# Patient Record
Sex: FEMALE | Race: WHITE | Employment: UNEMPLOYED | URBAN - METROPOLITAN AREA
[De-identification: names, ages, dates, MRNs, and addresses within clinical notes are randomized per-mention and may not be internally consistent; named-entity substitution may affect disease eponyms.]

---

## 2024-07-03 SDOH — HEALTH STABILITY: PHYSICAL HEALTH: ON AVERAGE, HOW MANY DAYS PER WEEK DO YOU ENGAGE IN MODERATE TO STRENUOUS EXERCISE (LIKE A BRISK WALK)?: 3 DAYS

## 2024-07-03 SDOH — HEALTH STABILITY: PHYSICAL HEALTH: ON AVERAGE, HOW MANY MINUTES DO YOU ENGAGE IN EXERCISE AT THIS LEVEL?: 30 MIN

## 2024-07-05 ENCOUNTER — OFFICE VISIT (OUTPATIENT)
Dept: ORTHOPEDIC SURGERY | Age: 34
End: 2024-07-05

## 2024-07-05 VITALS — WEIGHT: 240 LBS | BODY MASS INDEX: 37.67 KG/M2 | HEIGHT: 67 IN

## 2024-07-05 DIAGNOSIS — S93.492A SPRAIN OF ANTERIOR TALOFIBULAR LIGAMENT OF LEFT ANKLE, INITIAL ENCOUNTER: Primary | ICD-10-CM

## 2024-07-05 DIAGNOSIS — M25.572 LEFT ANKLE PAIN, UNSPECIFIED CHRONICITY: ICD-10-CM

## 2024-07-05 RX ORDER — MELOXICAM 15 MG/1
15 TABLET ORAL DAILY PRN
Qty: 30 TABLET | Refills: 0 | Status: SHIPPED | OUTPATIENT
Start: 2024-07-05

## 2024-07-05 NOTE — PROGRESS NOTES
ORTHOPAEDIC SURGERY H&P / CONSULTATION NOTE    Chief complaint:   Chief Complaint   Patient presents with    Ankle Pain     NP L ANKLE        History of present illness: The patient is a 33 y.o. female with subjective symptoms of left ankle pain. The chief complaint is located at anterior lateral aspect left ankle. Duration of symptoms has been for 3 days. The severity of symptoms is rated at 6/10 pain on intake form.  Patient injured her ankle on 7/2/2024 where she was stepping having unloaded the car with children and twisted her ankle because it was possible.  Ultimately she states dull throbbing aching pain as well as a sharp pain.  She went to an urgent care facility near the location of the injury.  She was placed in a boot.  She states previous ankle sprains.  She been taking ibuprofen and Tylenol as needed.  She states swelling.    The patient has tried the below listed items prior to today's consultation for above listed chief complaint.     +   Over-the-counter anti-inflammatories/prescription medication anti-inflammatory.     -   Physical therapy / guided home exercise program -     -   Previous corticosteroid injections    Past medical history:  No past medical history on file.     Past surgical history:  No past surgical history on file.     Allergies:    Allergies   Allergen Reactions    Amoxicillin Dermatitis, Hives, Itching and Rash    Progesterone Rash         Medications:   Current Outpatient Medications:     meloxicam (MOBIC) 15 MG tablet, Take 1 tablet by mouth daily as needed for Pain Take 1 tablet by mouth daily as needed for pain, Disp: 30 tablet, Rfl: 0     Social history: Denies IV drug use.    Social History     Socioeconomic History    Marital status:      Spouse name: Not on file    Number of children: Not on file    Years of education: Not on file    Highest education level: Not on file   Occupational History    Not on file   Tobacco Use    Smoking status: Not on file    Smokeless

## 2024-07-15 ENCOUNTER — HOSPITAL ENCOUNTER (OUTPATIENT)
Dept: PHYSICAL THERAPY | Age: 34
Setting detail: THERAPIES SERIES
Discharge: HOME | End: 2024-07-15
Payer: COMMERCIAL

## 2024-07-15 PROCEDURE — 97110 THERAPEUTIC EXERCISES: CPT

## 2024-07-15 PROCEDURE — 97161 PT EVAL LOW COMPLEX 20 MIN: CPT

## 2024-07-15 PROCEDURE — 97112 NEUROMUSCULAR REEDUCATION: CPT

## 2024-07-15 NOTE — PLAN OF CARE
[facility]- Outpatient Rehabilitation and Therapy [address], [place] [address] office: [phone] fax: [phone]     Physical Therapy Initial Evaluation Certification      Dear Tereso Briseno MD,    We had the pleasure of evaluating the following patient for physical therapy services at [facility].  A summary of our findings can be found in the initial assessment below.  This includes our plan of care.  If you have any questions or concerns regarding these findings, please do not hesitate to contact me at the office phone number listed above.  Thank you for the referral.     Physician Signature:_______________________________Date:__________________  By signing above (or electronic signature), therapist’s plan is approved by physician       Physical Therapy: TREATMENT/PROGRESS NOTE   Patient: Nyasia Lorenzo (33 y.o. female)   Examination Date: 07/15/2024   :  1990 MRN: [id number]   Visit #: 1   Insurance Allowable Auth Needed   AUTH []Yes    [x]No    Insurance: Payor: [facility] / Plan: [facility] - [place] PPO / Product Type: *No Product type* /   Insurance ID: [id number] - ([facility])  Secondary Insurance (if applicable):    Treatment Diagnosis: Left ankle pain   Medical Diagnosis:  Sprain of anterior talofibular ligament of left ankle, initial encounter [S93.492A]   Referring Physician: Tereso Briseno MD  PCP: No primary care provider on file.       Plan of care signed (Y/N):     Date of Patient follow up with Physician:      Progress Report Due: 8/15/24  POC update due: 10/15/24                                            Precautions/ Contra-indications:           Latex allergy:  NO  Pacemaker:    NO  Contraindications for Manipulation: None  Date of Surgery: NA  Other: NA    Red Flags:  None      Preferred Language for Healthcare:   [x] English       [] other:    SUBJECTIVE EXAMINATION     Patient states a couple of

## 2024-07-22 ENCOUNTER — HOSPITAL ENCOUNTER (OUTPATIENT)
Dept: PHYSICAL THERAPY | Age: 34
Setting detail: THERAPIES SERIES
Discharge: HOME | End: 2024-07-22
Payer: COMMERCIAL

## 2024-07-22 PROCEDURE — 97110 THERAPEUTIC EXERCISES: CPT

## 2024-07-22 PROCEDURE — 97112 NEUROMUSCULAR REEDUCATION: CPT

## 2024-07-22 NOTE — FLOWSHEET NOTE
[facility]- Outpatient Rehabilitation and Therapy [address], [place] [address] office: [phone] fax: [phone]           Physical Therapy: TREATMENT/PROGRESS NOTE   Patient: Nyasia Lorenzo (33 y.o. female)   Examination Date: 2024   :  1990 MRN: [id number]   Visit #: 2   Insurance Allowable Auth Needed   AUTH []Yes    [x]No    Insurance: Payor: [employer] / Plan: [employer] - [place] PPO / Product Type: *No Product type* /   Insurance ID: [id number] - ([facility])  Secondary Insurance (if applicable):    Treatment Diagnosis: Left ankle pain   Medical Diagnosis:  Sprain of anterior talofibular ligament of left ankle, initial encounter [S93.492A]   Referring Physician: Tereso Briseno MD  PCP: No primary care provider on file.       Plan of care signed (Y/N):     Date of Patient follow up with Physician:      Progress Report Due: 8/15/24  POC update due: 10/15/24                                            Precautions/ Contra-indications:           Latex allergy:  NO  Pacemaker:    NO  Contraindications for Manipulation: None  Date of Surgery: NA  Other: NA    Assessment: Summary:  Nyasia Lorenzo is a 33 y.o. female presenting today to Outpatient PT with primairy complaint of left lateral ankle pain from inversion sprain which has been occurring since 24. Pt is noted to have reduced ankle ROM. No edema present currently. Mild gait deviation.      SUBJECTIVE EXAMINATION     Patient states ankle is getting better. Still occasional sharp pain when first standing but that is also better.        Test used Initial score  7/15/24 2024   Pain Summary VAS 2-7 1   Functional questionnaire LEFS 36%          OBJECTIVE EXAMINATION           Exercises/Interventions         RESTRICTIONS/PRECAUTIONS: Limit inversion, plantarflexion combined.     Exercises/Interventions:     Therapeutic Ex (93176)/Neuro 42525  HEP 7/15/24 7/22/24           Warm-up       Rec bike

## 2024-07-29 ENCOUNTER — HOSPITAL ENCOUNTER (OUTPATIENT)
Dept: PHYSICAL THERAPY | Age: 34
Setting detail: THERAPIES SERIES
Discharge: HOME | End: 2024-07-29
Payer: COMMERCIAL